# Patient Record
Sex: FEMALE | Race: WHITE | Employment: OTHER | URBAN - METROPOLITAN AREA
[De-identification: names, ages, dates, MRNs, and addresses within clinical notes are randomized per-mention and may not be internally consistent; named-entity substitution may affect disease eponyms.]

---

## 2017-04-28 ENCOUNTER — TRANSFERRED RECORDS (OUTPATIENT)
Dept: HEALTH INFORMATION MANAGEMENT | Facility: CLINIC | Age: 67
End: 2017-04-28

## 2017-06-28 ENCOUNTER — OFFICE VISIT (OUTPATIENT)
Dept: OBGYN | Facility: CLINIC | Age: 67
End: 2017-06-28
Payer: COMMERCIAL

## 2017-06-28 VITALS
BODY MASS INDEX: 34.96 KG/M2 | WEIGHT: 190 LBS | HEIGHT: 62 IN | SYSTOLIC BLOOD PRESSURE: 112 MMHG | DIASTOLIC BLOOD PRESSURE: 74 MMHG

## 2017-06-28 DIAGNOSIS — Z01.419 ENCOUNTER FOR GYNECOLOGICAL EXAMINATION WITHOUT ABNORMAL FINDING: Primary | ICD-10-CM

## 2017-06-28 PROCEDURE — 99397 PER PM REEVAL EST PAT 65+ YR: CPT | Performed by: OBSTETRICS & GYNECOLOGY

## 2017-06-28 PROCEDURE — G0145 SCR C/V CYTO,THINLAYER,RESCR: HCPCS | Performed by: OBSTETRICS & GYNECOLOGY

## 2017-06-28 RX ORDER — PRAVASTATIN SODIUM 40 MG
TABLET ORAL
COMMUNITY
Start: 2017-04-26

## 2017-06-28 RX ORDER — LEVOTHYROXINE SODIUM 150 UG/1
TABLET ORAL
COMMUNITY
Start: 2017-05-26

## 2017-06-28 RX ORDER — CYANOCOBALAMIN 1000 UG/ML
INJECTION, SOLUTION INTRAMUSCULAR; SUBCUTANEOUS
COMMUNITY
Start: 2017-05-10 | End: 2021-07-21

## 2017-06-28 ASSESSMENT — ANXIETY QUESTIONNAIRES
5. BEING SO RESTLESS THAT IT IS HARD TO SIT STILL: NOT AT ALL
2. NOT BEING ABLE TO STOP OR CONTROL WORRYING: NOT AT ALL
IF YOU CHECKED OFF ANY PROBLEMS ON THIS QUESTIONNAIRE, HOW DIFFICULT HAVE THESE PROBLEMS MADE IT FOR YOU TO DO YOUR WORK, TAKE CARE OF THINGS AT HOME, OR GET ALONG WITH OTHER PEOPLE: NOT DIFFICULT AT ALL
3. WORRYING TOO MUCH ABOUT DIFFERENT THINGS: NOT AT ALL
6. BECOMING EASILY ANNOYED OR IRRITABLE: NOT AT ALL
GAD7 TOTAL SCORE: 0
7. FEELING AFRAID AS IF SOMETHING AWFUL MIGHT HAPPEN: NOT AT ALL
1. FEELING NERVOUS, ANXIOUS, OR ON EDGE: NOT AT ALL

## 2017-06-28 ASSESSMENT — PATIENT HEALTH QUESTIONNAIRE - PHQ9: 5. POOR APPETITE OR OVEREATING: NOT AT ALL

## 2017-06-28 NOTE — PROGRESS NOTES
Lois is a 67 year old No obstetric history on file. female who presents for annual exam.     Besides routine health maintenance, she has no other health concerns today .    Do you have a Health Care Directive?: No: Advance care planning was reviewed with patient; patient declined at this time.    Fall risk:   Fallen 2 or more times in the past year?: No  Any fall with injury in the past year?: No    HPI: The patient is seen for annual exam. She has a history of Bowen's disease of the vulva. She has had a hysterectomy.  The patient's PCP is Fuad Madera MD.     GYNECOLOGIC HISTORY:  No LMP recorded. Patient has had a hysterectomy..   reports that she has never smoked. She has never used smokeless tobacco.    Patient is sexually active.  STD testing offered?  Declined  Last PHQ-9 score on record=   PHQ-9 SCORE 6/28/2017   Total Score 0     Last GAD7 score on record=   ELDON-7 SCORE 6/2/2016 6/28/2017   Total Score 0 0     Alcohol Score = 2    HEALTH MAINTENANCE:  Cholesterol: None found.  Last Mammo: one year ago, Result: normal, Next Mammo: tomorrow at [facility]  Pap:   Lab Results   Component Value Date    PAP NIL 06/02/2016    PAP NIL 05/29/2015      DEXA:  02/11/09  Colonoscopy:  2 years ago, Result:  normal, Next Colonoscopy: 8 years.    Health maintenance updated:  yes    HISTORY:  Obstetric History     No data available        There is no problem list on file for this patient.    Past Surgical History:   Procedure Laterality Date     CHOLECYSTECTOMY  2002     ENT SURGERY      ta     GYN SURGERY      sofiya      Social History   Substance Use Topics     Smoking status: Never Smoker     Smokeless tobacco: Never Used     Alcohol use Yes           Current Outpatient Prescriptions   Medication Sig     cyanocobalamin (VITAMIN B12) 1000 MCG/ML injection      levothyroxine (SYNTHROID/LEVOTHROID) 150 MCG tablet Take one tablet by mouth daily before breakfast     acyclovir (ZOVIRAX) 400 MG tablet Take 1 tablet (400 mg) by  "mouth daily     hydrocortisone (CORTEF) 20 MG tablet      estradiol 0.075 MG/24HR PTWK      clonazePAM (KLONOPIN) 0.5 MG tablet      metFORMIN (GLUCOPHAGE-XR) 500 MG 24 hr tablet      pravastatin (PRAVACHOL) 40 MG tablet      No current facility-administered medications for this visit.        Allergies   Allergen Reactions     Nsaids      Other reaction(s): Other - Describe In Comment Field  Pt had gastric bypass surgery, should not take NSAIDS.       Past medical, surgical, social and family history were reviewed and updated in EPIC.    ROS:   12 point review of systems negative other than symptoms noted below.  Eyes: Spots and Vision Loss    EXAM:  /74  Ht 5' 2\" (1.575 m)  Wt 190 lb (86.2 kg)  BMI 34.75 kg/m2   BMI: Body mass index is 34.75 kg/(m^2).    EXAM:  Constitutional: Appearance: Well nourished, well developed alert, in no acute distress  Neck:  Lymph Nodes:  No lymphadenopathy present    Thyroid:  Gland size normal, nontender, no nodules or masses present  on palpation  Chest:  Respiratory Effort:  Breathing unlabored  Cardiovascular:Heart    Auscultation:  Regular rate, normal rhythm, no murmurs present  Breasts: Inspection of Breasts:  No lymphadenopathy present    Palpation of Breasts and Axillae:  No masses present on palpation, no  breast tenderness    Axillary Lymph Nodes:  No lymphadenopathy present  Gastrointestinal:  Abdominal Examination:  Abdomen nontender to palpation, tone normal without     rigidity or guarding, no masses present, umbilicus without lesions    Liver and speen:  No hepatomegaly present, liver nontender to palpation    Hernias:  No hernias present  Lymphatic: Lymph Nodes:  No other lymphadenopathy present  Skin:  General Inspection:  No rashes present, no lesions present, no areas of  discoloration.    Genitalia and Groin:  No rashes present, no lesions present, no areas of  discoloration, no masses present  Neurologic/Psychiatric:    Mental Status:  Oriented " X3     Pelvic Exam:  External Genitalia:     Normal appearance for age, no discharge present, no tenderness present, no inflammatory lesions present, color normal  Vagina:     Normal vaginal vault without central or paravaginal defects, no discharge present, no inflammatory lesions present, no masses present  Bladder:     Nontender to palpation  Urethra:   Urethral Body:  Urethra palpation normal, urethra structural support normal   Urethral Meatus:  No erythema or lesions present  Cervix:     Surgically absent  Uterus:     Surgically absent  Adnexa:     Surgically absent  Perineum:     Perineum within normal limits, no evidence of trauma, no rashes or skin lesions present  Anus:     Anus within normal limits, no hemorrhoids present  Inguinal Lymph Nodes:     No lymphadenopathy present    COUNSELING:   Reviewed preventive health counseling, as reflected in patient instructions       Regular exercise       Healthy diet/nutrition    BMI:  Body mass index is 34.75 kg/(m^2).  Weight management plan noted, stable and monitoring   reports that she has never smoked. She has never used smokeless tobacco.      ASSESSMENT:  67 year old female with satisfactory annual exam.    ICD-10-CM    1. Encounter for gynecological examination without abnormal finding Z01.419 Pap imaged thin layer screen reflex to HPV if ASCUS - recommended age 25 - 29 years       PLAN: The patient is seen at this time for her annual exam. She shows no sign of any recurrence of vulvar disease. She requests ongoing screening on a yearly basis.      Nicola Burkett MD

## 2017-06-28 NOTE — Clinical Note
Please abstract the following data from this visit with this patient into the appropriate field in Epic:  Colonoscopy done on this date: 10/01/2015 (approximately), by this group: [facility], results were normal.

## 2017-06-28 NOTE — MR AVS SNAPSHOT
"              After Visit Summary   2017    Lois Hudson    MRN: [id number]           Patient Information     Date Of Birth          1950        Visit Information        Provider Department      2017 12:00 PM Nicola Burkett MD [facility]        Today's Diagnoses     Encounter for gynecological examination without abnormal finding    -  1       Follow-ups after your visit        Who to contact     If you have questions or need follow up information about today's clinic visit or your schedule please contact [facility] directly at [phone].  Normal or non-critical lab and imaging results will be communicated to you by Frediohart, letter or phone within 4 business days after the clinic has received the results. If you do not hear from us within 7 days, please contact the clinic through Frediohart or phone. If you have a critical or abnormal lab result, we will notify you by phone as soon as possible.  Submit refill requests through Amicrobe or call your pharmacy and they will forward the refill request to us. Please allow 3 business days for your refill to be completed.          Additional Information About Your Visit        MyChart Information     Amicrobe lets you send messages to your doctor, view your test results, renew your prescriptions, schedule appointments and more. To sign up, go to [email or web address] . Click on \"Log in\" on the left side of the screen, which will take you to the Welcome page. Then click on \"Sign up Now\" on the right side of the page.     You will be asked to enter the access code listed below, as well as some personal information. Please follow the directions to create your username and password.     Your access code is: [email or web address]  Expires: 2017 12:31 PM     Your access code will  in 90 days. If you need help or a new code, please call your [facility] clinic or [phone].        Care EveryWhere ID     This is your " "Care EveryWhere ID. This could be used by other organizations to access your [facility] medical records  [id number]        Your Vitals Were     Height BMI (Body Mass Index)                5' 2\" (1.575 m) 34.75 kg/m2           Blood Pressure from Last 3 Encounters:   06/28/17 112/74   06/02/16 110/68   05/29/15 116/72    Weight from Last 3 Encounters:   06/28/17 190 lb (86.2 kg)   06/02/16 185 lb (83.9 kg)   05/29/15 182 lb (82.6 kg)              We Performed the Following     Pap imaged thin layer screen reflex to HPV if ASCUS - recommended age 25 - 29 years        Primary Care Provider Office Phone # Fax #    Fuad Madera [phone]       [facility] [address]  [facility] [address]        Equal Access to Services     [facility]: Hadii aad ku hadasho Soomaali, waaxda luqadaha, qaybta kaalmada adeegyada, felipa garcia haymeagan [facility] . So [facility] [phone].    ATENCIÓN: Si habla español, tiene a rojas disposición servicios gratuitos de asistencia lingüística. Llame al [phone].    We comply with applicable federal civil rights laws and [place] laws. We do not discriminate on the basis of race, color, national origin, age, disability sex, sexual orientation or gender identity.            Thank you!     Thank you for choosing [facility]  for your care. Our goal is always to provide you with excellent care. Hearing back from our patients is one way we can continue to improve our services. Please take a few minutes to complete the written survey that you may receive in the mail after your visit with us. Thank you!             Your Updated Medication List - Protect others around you: Learn how to safely use, store and throw away your medicines at www.disposemymeds.org.          This list is accurate as of: 6/28/17 12:31 PM.  Always use your most recent med list.                   Brand Name Dispense Instructions for use Diagnosis    acyclovir 400 " MG tablet    ZOVIRAX    90 tablet    Take 1 tablet (400 mg) by mouth daily    HSV (herpes simplex virus) infection       clonazePAM 0.5 MG tablet    klonoPIN          cyanocobalamin 1000 MCG/ML injection    VITAMIN B12          estradiol 0.075 MG/24HR Ptwk           hydrocortisone 20 MG tablet    CORTEF          levothyroxine 150 MCG tablet    SYNTHROID/LEVOTHROID     Take one tablet by mouth daily before breakfast        metFORMIN 500 MG 24 hr tablet    GLUCOPHAGE-XR          pravastatin 40 MG tablet    PRAVACHOL

## 2017-06-28 NOTE — LETTER
July 12, 2017      Lois Hudson  [address] [place] [address]  [facility] [address]    Dear ,      I am happy to inform you that your recent cervical cancer screening test (PAP smear) was normal.      Preventative screenings such as this help to ensure your health for years to come. You should repeat a pap smear in 1 year, unless otherwise directed.      You will still need to return to the clinic every year for your annual exam and other preventive tests.     Please contact the clinic at [phone] if you have further questions.       Sincerely,      Nicola Burkett MD/francisco j

## 2017-06-29 ASSESSMENT — ANXIETY QUESTIONNAIRES: GAD7 TOTAL SCORE: 0

## 2017-06-29 ASSESSMENT — PATIENT HEALTH QUESTIONNAIRE - PHQ9: SUM OF ALL RESPONSES TO PHQ QUESTIONS 1-9: 0

## 2017-07-03 LAB
COPATH REPORT: NORMAL
PAP: NORMAL

## 2017-07-05 ENCOUNTER — TRANSFERRED RECORDS (OUTPATIENT)
Dept: HEALTH INFORMATION MANAGEMENT | Facility: CLINIC | Age: 67
End: 2017-07-05

## 2018-02-02 ENCOUNTER — TRANSFERRED RECORDS (OUTPATIENT)
Dept: HEALTH INFORMATION MANAGEMENT | Facility: CLINIC | Age: 68
End: 2018-02-02

## 2018-02-15 ENCOUNTER — TRANSFERRED RECORDS (OUTPATIENT)
Dept: HEALTH INFORMATION MANAGEMENT | Facility: CLINIC | Age: 68
End: 2018-02-15

## 2018-04-25 DIAGNOSIS — B00.9 HSV (HERPES SIMPLEX VIRUS) INFECTION: ICD-10-CM

## 2018-04-25 RX ORDER — ACYCLOVIR 400 MG/1
400 TABLET ORAL DAILY
Qty: 90 TABLET | Refills: 0 | Status: SHIPPED | OUTPATIENT
Start: 2018-04-25 | End: 2018-07-21

## 2018-04-25 NOTE — TELEPHONE ENCOUNTER
Pending Prescriptions:                       Disp   Refills    acyclovir (ZOVIRAX) 400 MG tablet         90 tab*3            Sig: Take 1 tablet (400 mg) by mouth daily          Last Written Prescription Date:  3/15/16  Last Fill Quantity: 90,   # refills: 3  Last Office Visit with MARCUS primary care provider:  6/28/17  Future Office visit: none

## 2018-06-13 ENCOUNTER — TRANSFERRED RECORDS (OUTPATIENT)
Dept: HEALTH INFORMATION MANAGEMENT | Facility: CLINIC | Age: 68
End: 2018-06-13

## 2018-06-29 ENCOUNTER — TRANSFERRED RECORDS (OUTPATIENT)
Dept: HEALTH INFORMATION MANAGEMENT | Facility: CLINIC | Age: 68
End: 2018-06-29

## 2018-06-29 ENCOUNTER — TELEPHONE (OUTPATIENT)
Dept: OPHTHALMOLOGY | Facility: CLINIC | Age: 68
End: 2018-06-29

## 2018-06-29 NOTE — TELEPHONE ENCOUNTER
[facility] has been sending P notes for this Patient has not been seen here for 5 years I let them know to find out who she is seeing for her regular eye care. They will stop sending to this office. Walter Sam  Is retired.

## 2018-07-21 DIAGNOSIS — B00.9 HSV (HERPES SIMPLEX VIRUS) INFECTION: ICD-10-CM

## 2018-07-23 RX ORDER — ACYCLOVIR 400 MG/1
TABLET ORAL
Qty: 90 TABLET | Refills: 0 | Status: SHIPPED | OUTPATIENT
Start: 2018-07-23 | End: 2019-02-13

## 2018-07-23 NOTE — TELEPHONE ENCOUNTER
"Requested Prescriptions   Pending Prescriptions Disp Refills     acyclovir (ZOVIRAX) 400 MG tablet [Pharmacy Med Name: ACYCLOVIR 400MG     TAB] 90 tablet 0     Sig: TAKE 1 TABLET BY MOUTH ONCE DAILY    Antivirals for Herpes Protocol Failed    7/21/2018  5:30 AM       Failed - Recent (12 mo) or future (30 days) visit within the authorizing provider's specialty    Patient had office visit in the last 12 months or has a visit in the next 30 days with authorizing provider or within the authorizing provider's specialty.  See \"Patient Info\" tab in inbasket, or \"Choose Columns\" in Meds & Orders section of the refill encounter.           Failed - Normal serum creatinine on file in past 12 months    No lab results found.         Passed - Patient is age 12 or older        Last Written Prescription Date:  4/25/18  Last Fill Quantity: 90,  # refills: 0   Last office visit: 6/28/2017 with prescribing provider:     Future Office Visit:      "

## 2019-02-13 ENCOUNTER — OFFICE VISIT (OUTPATIENT)
Dept: OBGYN | Facility: CLINIC | Age: 69
End: 2019-02-13
Payer: MEDICARE

## 2019-02-13 VITALS
SYSTOLIC BLOOD PRESSURE: 100 MMHG | DIASTOLIC BLOOD PRESSURE: 62 MMHG | HEART RATE: 78 BPM | WEIGHT: 166 LBS | HEIGHT: 62 IN | BODY MASS INDEX: 30.55 KG/M2

## 2019-02-13 DIAGNOSIS — N95.1 SYMPTOMS, SUCH AS FLUSHING, SLEEPLESSNESS, HEADACHE, LACK OF CONCENTRATION, ASSOCIATED WITH THE MENOPAUSE: ICD-10-CM

## 2019-02-13 DIAGNOSIS — B00.9 HSV (HERPES SIMPLEX VIRUS) INFECTION: ICD-10-CM

## 2019-02-13 DIAGNOSIS — Z01.419 ENCOUNTER FOR GYNECOLOGICAL EXAMINATION WITHOUT ABNORMAL FINDING: Primary | ICD-10-CM

## 2019-02-13 PROCEDURE — G0476 HPV COMBO ASSAY CA SCREEN: HCPCS | Performed by: OBSTETRICS & GYNECOLOGY

## 2019-02-13 PROCEDURE — 87624 HPV HI-RISK TYP POOLED RSLT: CPT | Performed by: OBSTETRICS & GYNECOLOGY

## 2019-02-13 PROCEDURE — G0145 SCR C/V CYTO,THINLAYER,RESCR: HCPCS | Performed by: OBSTETRICS & GYNECOLOGY

## 2019-02-13 PROCEDURE — G0101 CA SCREEN;PELVIC/BREAST EXAM: HCPCS | Performed by: OBSTETRICS & GYNECOLOGY

## 2019-02-13 RX ORDER — ESTRADIOL 0.07 MG/D
1 PATCH TRANSDERMAL
Qty: 12 PATCH | Refills: 3 | Status: SHIPPED | OUTPATIENT
Start: 2019-02-13 | End: 2020-07-06

## 2019-02-13 RX ORDER — ACYCLOVIR 400 MG/1
400 TABLET ORAL DAILY
Qty: 90 TABLET | Refills: 3 | Status: SHIPPED | OUTPATIENT
Start: 2019-02-13

## 2019-02-13 RX ORDER — PREDNISOLONE ACETATE 10 MG/ML
1 SUSPENSION/ DROPS OPHTHALMIC
COMMUNITY
Start: 2018-10-24

## 2019-02-13 ASSESSMENT — ANXIETY QUESTIONNAIRES
3. WORRYING TOO MUCH ABOUT DIFFERENT THINGS: NOT AT ALL
GAD7 TOTAL SCORE: 0
2. NOT BEING ABLE TO STOP OR CONTROL WORRYING: NOT AT ALL
6. BECOMING EASILY ANNOYED OR IRRITABLE: NOT AT ALL
7. FEELING AFRAID AS IF SOMETHING AWFUL MIGHT HAPPEN: NOT AT ALL
1. FEELING NERVOUS, ANXIOUS, OR ON EDGE: NOT AT ALL
5. BEING SO RESTLESS THAT IT IS HARD TO SIT STILL: NOT AT ALL

## 2019-02-13 ASSESSMENT — PATIENT HEALTH QUESTIONNAIRE - PHQ9
5. POOR APPETITE OR OVEREATING: NOT AT ALL
SUM OF ALL RESPONSES TO PHQ QUESTIONS 1-9: 0

## 2019-02-13 ASSESSMENT — MIFFLIN-ST. JEOR: SCORE: 1241.22

## 2019-02-13 NOTE — PROGRESS NOTES
Lois is a 68 year old No obstetric history on file. female who presents for Medicare Limited exam.     Do you have a Health Care Directive?: Yes: Patient states has Advance Directive and will bring in a copy to clinic.    Fall risk:   Fallen 2 or more times in the past year?: No  Any fall with injury in the past year?: No    HPI : The patient is seen at this time for her annual GYN exam.  She has a past history of Bowen's disease of the vulva with no evidence of recurrence for years.  She has had a hysterectomy.  She has an unexplained 34 pound weight loss over the last year.  She is seeing her internist tomorrow.      GYNECOLOGIC HISTORY:  No LMP recorded. Patient has had a hysterectomy..   reports that  has never smoked. she has never used smokeless tobacco.    STD testing offered?  Declined  Last PHQ-9 score on record=   PHQ-9 SCORE 2/13/2019   PHQ-9 Total Score 0     Last GAD7 score on record=   ELDON-7 SCORE 6/2/2016 6/28/2017 2/13/2019   Total Score 0 0 0       HEALTH MAINTENANCE:  Cholesterol: (No results found for: CHOL   Last Mammo: 6/13/18 @ [facility], Result: normal, Next Mammo: June 2019   Pap: 6/28/17 neg  DEXA: 2/11/09  Colonoscopy: 6/5/13, Result:  normal, Next Colonoscopy: 4 years.    HISTORY:  Obstetric History     No data available        Past Medical History:   Diagnosis Date     Diabetes (H)      Past Surgical History:   Procedure Laterality Date     CHOLECYSTECTOMY  2002     ENT SURGERY      ta     GYN SURGERY      sofiya     VULVECTOMY SIMPLE Right 2001    abstracted report, KRISTA-3     No family history on file.  Social History     Socioeconomic History     Marital status: Single     Spouse name: Not on file     Number of children: Not on file     Years of education: Not on file     Highest education level: Not on file   Social Needs     Financial resource strain: Not on file     Food insecurity - worry: Not on file     Food insecurity - inability: Not on file     Transportation needs - medical: Not  "on file     Transportation needs - non-medical: Not on file   Occupational History     Not on file   Tobacco Use     Smoking status: Never Smoker     Smokeless tobacco: Never Used   Substance and Sexual Activity     Alcohol use: Yes     Drug use: No     Sexual activity: Not Currently   Other Topics Concern     Parent/sibling w/ CABG, MI or angioplasty before 65F 55M? Not Asked   Social History Narrative     Not on file     Current Outpatient Medications   Medication Sig     prednisoLONE acetate (PRED FORTE) 1 % ophthalmic suspension Apply 1 drop to eye     acyclovir (ZOVIRAX) 400 MG tablet TAKE 1 TABLET BY MOUTH ONCE DAILY     clonazePAM (KLONOPIN) 0.5 MG tablet      cyanocobalamin (VITAMIN B12) 1000 MCG/ML injection      estradiol 0.075 MG/24HR PTWK      hydrocortisone (CORTEF) 20 MG tablet      levothyroxine (SYNTHROID/LEVOTHROID) 150 MCG tablet Take one tablet by mouth daily before breakfast     metFORMIN (GLUCOPHAGE-XR) 500 MG 24 hr tablet      pravastatin (PRAVACHOL) 40 MG tablet      No current facility-administered medications for this visit.      Allergies   Allergen Reactions     Nsaids      Other reaction(s): Other - Describe In Comment Field  Pt had gastric bypass surgery, should not take NSAIDS.       Past medical, surgical, social and family history were reviewed and updated in EPIC.    EXAM:  /62   Pulse 78   Ht 1.575 m (5' 2\")   Wt 75.3 kg (166 lb)   BMI 30.36 kg/m     BMI: Body mass index is 30.36 kg/m .    Constitutional: Appearance: Well nourished, well developed alert, in no acute distress  Breasts: Inspection of Breasts:  No lymphadenopathy present    Palpation of Breasts and Axillae:  No masses present on palpation, no  breast tenderness    Axillary Lymph Nodes:  No lymphadenopathy present  Neurologic/Psychiatric:    Mental Status:  Oriented X3     Pelvic Exam:  External Genitalia:     Normal appearance for age, no discharge present, no tenderness present, no inflammatory lesions " present, color normal  Vagina:     Normal vaginal vault without central or paravaginal defects, no discharge present, no inflammatory lesions present, no masses present  Bladder:     Nontender to palpation  Urethra:   Urethral Body:  Urethra palpation normal, urethra structural support normal   Urethral Meatus:  No erythema or lesions present  Cervix:     Surgically absent  Uterus:     Surgically absent  Adnexa:     Surgically absent  Perineum:     Perineum within normal limits, no evidence of trauma, no rashes or skin lesions present  Anus:     Anus within normal limits, no hemorrhoids present  Inguinal Lymph Nodes:     No lymphadenopathy present    Body mass index is 30.36 kg/m .  Weight management plan noted, stable and monitoring   reports that  has never smoked. she has never used smokeless tobacco.      ASSESSMENT:  68 year old female with satisfactory annual exam.    ICD-10-CM    1. Encounter for gynecological examination without abnormal finding Z01.419 Pap imaged thin layer screen with HPV - recommended age 30 - 65     HPV High Risk Types DNA Cervical     Medicare Limited Visit   2. HSV (herpes simplex virus) infection B00.9        COUNSELING:   Reviewed preventive health counseling, as reflected in patient instructions       Regular exercise       Healthy diet/nutrition    PLAN/PATIENT INSTRUCTIONS:    The patient has her mammograms performed elsewhere and have been normal.  We will refill her medications.    Nicola Burkett MD

## 2019-02-13 NOTE — LETTER
February 22, 2019    Lois Hudson  [facility] [address]  [facility] [address]    Dear ,  This letter is regarding your recent Pap smear (cervical cancer screening) and Human Papillomavirus (HPV) test.  We are happy to inform you that your Pap smear result is normal. Cervical cancer is closely linked with certain types of HPV. Your results showed no evidence of high-risk HPV.  Therefore we recommend you return in 1 year for your next pap smear.  You will still need to return to the clinic every year for an annual exam and other preventive tests.  If you have additional questions regarding this result, please call our registered nurse, Ekaterina at [phone].  Sincerely,    Nicola Burkett MD/francisco j

## 2019-02-14 ASSESSMENT — ANXIETY QUESTIONNAIRES: GAD7 TOTAL SCORE: 0

## 2019-02-15 LAB
COPATH REPORT: NORMAL
PAP: NORMAL

## 2019-02-19 LAB
FINAL DIAGNOSIS: NORMAL
HPV HR 12 DNA CVX QL NAA+PROBE: NEGATIVE
HPV16 DNA SPEC QL NAA+PROBE: NEGATIVE
HPV18 DNA SPEC QL NAA+PROBE: NEGATIVE
SPECIMEN DESCRIPTION: NORMAL
SPECIMEN SOURCE CVX/VAG CYTO: NORMAL

## 2019-03-22 ENCOUNTER — TRANSFERRED RECORDS (OUTPATIENT)
Dept: HEALTH INFORMATION MANAGEMENT | Facility: CLINIC | Age: 69
End: 2019-03-22

## 2020-07-06 DIAGNOSIS — N95.1 SYMPTOMS, SUCH AS FLUSHING, SLEEPLESSNESS, HEADACHE, LACK OF CONCENTRATION, ASSOCIATED WITH THE MENOPAUSE: ICD-10-CM

## 2020-07-06 RX ORDER — ESTRADIOL 0.07 MG/D
PATCH TRANSDERMAL
Qty: 1 PATCH | Refills: 0 | Status: SHIPPED | OUTPATIENT
Start: 2020-07-06

## 2020-07-06 NOTE — TELEPHONE ENCOUNTER
"Requested Prescriptions   Pending Prescriptions Disp Refills     estradiol (CLIMARA) 0.075 MG/24HR weekly patch [Pharmacy Med Name: Estradiol 0.075 MG/24HR Transdermal Patch Weekly]  0     Sig: APPLY 1 PATCH TOPICALLY ONCE A WEEK       Hormone Replacement Therapy Failed - 7/6/2020  8:03 AM        Failed - Blood pressure under 140/90 in past 12 months     BP Readings from Last 3 Encounters:   02/13/19 100/62   06/28/17 112/74   06/02/16 110/68                 Failed - Recent (12 mo) or future (30 days) visit within the authorizing provider's specialty     Patient has had an office visit with the authorizing provider or a provider within the authorizing providers department within the previous 12 mos or has a future within next 30 days. See \"Patient Info\" tab in inbasket, or \"Choose Columns\" in Meds & Orders section of the refill encounter.              Failed - Patient has mammogram in past 2 years on file if age 50-75        Passed - Medication is active on med list        Passed - Patient is 18 years of age or older        Passed - No active pregnancy on record        Passed - No positive pregnancy test on record in past 12 months           One refill sent  Appointment needed for further refills  Demetra Gay RN on 7/6/2020 at 8:51 AM    "

## 2020-07-07 RX ORDER — ESTRADIOL 0.07 MG/D
PATCH TRANSDERMAL
Qty: 1 PATCH | Refills: 0 | OUTPATIENT
Start: 2020-07-07

## 2020-07-07 NOTE — TELEPHONE ENCOUNTER
rec'd message from pharmacy that they cannot fill just 1 patch. New prescription needs to be sent for 1 box.

## 2020-07-07 NOTE — TELEPHONE ENCOUNTER
Refused refill for more than one patch. Overdue for annual exam by 5 months. Message sent to pharmacy.  Maria Eugenia Luis RN on 7/7/2020 at 4:08 PM

## 2020-07-09 DIAGNOSIS — N95.1 SYMPTOMS, SUCH AS FLUSHING, SLEEPLESSNESS, HEADACHE, LACK OF CONCENTRATION, ASSOCIATED WITH THE MENOPAUSE: ICD-10-CM

## 2020-07-09 RX ORDER — ESTRADIOL 0.07 MG/D
4 PATCH TRANSDERMAL
Qty: 1 PATCH | Refills: 0 | OUTPATIENT
Start: 2020-07-09

## 2020-07-09 NOTE — TELEPHONE ENCOUNTER
"Requested Prescriptions   Pending Prescriptions Disp Refills     estradiol (CLIMARA) 0.075 MG/24HR weekly patch 1 patch 0     Sig: Place 4 patches onto the skin       Hormone Replacement Therapy Failed - 7/9/2020 12:18 PM        Failed - Blood pressure under 140/90 in past 12 months     BP Readings from Last 3 Encounters:   02/13/19 100/62   06/28/17 112/74   06/02/16 110/68                 Failed - Recent (12 mo) or future (30 days) visit within the authorizing provider's specialty     Patient has had an office visit with the authorizing provider or a provider within the authorizing providers department within the previous 12 mos or has a future within next 30 days. See \"Patient Info\" tab in inbasket, or \"Choose Columns\" in Meds & Orders section of the refill encounter.              Failed - Patient has mammogram in past 2 years on file if age 50-75        Passed - Medication is active on med list        Passed - Patient is 18 years of age or older        Passed - No active pregnancy on record        Passed - No positive pregnancy test on record in past 12 months           Last Written Prescription Date:  07/06/2020  Last Fill Quantity: 1 patch,  # refills: 0   Last office visit: 2/13/2019 with prescribing provider:  Nicola Burkett   Future Office Visit:  none  Denied  Appointment needed for further refills  Demetra Gay RN on 7/9/2020 at 12:25 PM      "

## 2021-07-21 ENCOUNTER — OFFICE VISIT (OUTPATIENT)
Dept: OBGYN | Facility: CLINIC | Age: 71
End: 2021-07-21
Payer: MEDICARE

## 2021-07-21 VITALS
HEART RATE: 64 BPM | WEIGHT: 166.2 LBS | BODY MASS INDEX: 31.38 KG/M2 | SYSTOLIC BLOOD PRESSURE: 132 MMHG | HEIGHT: 61 IN | DIASTOLIC BLOOD PRESSURE: 70 MMHG

## 2021-07-21 DIAGNOSIS — Z01.419 ENCOUNTER FOR GYNECOLOGICAL EXAMINATION WITHOUT ABNORMAL FINDING: Primary | ICD-10-CM

## 2021-07-21 PROCEDURE — G0145 SCR C/V CYTO,THINLAYER,RESCR: HCPCS | Performed by: OBSTETRICS & GYNECOLOGY

## 2021-07-21 PROCEDURE — G0101 CA SCREEN;PELVIC/BREAST EXAM: HCPCS | Performed by: OBSTETRICS & GYNECOLOGY

## 2021-07-21 PROCEDURE — 87624 HPV HI-RISK TYP POOLED RSLT: CPT | Performed by: OBSTETRICS & GYNECOLOGY

## 2021-07-21 RX ORDER — LIOTHYRONINE SODIUM 5 UG/1
5 TABLET ORAL
COMMUNITY
Start: 2020-10-30

## 2021-07-21 RX ORDER — PROPRANOLOL HCL 60 MG
60 CAPSULE, EXTENDED RELEASE 24HR ORAL
COMMUNITY
Start: 2020-10-30

## 2021-07-21 RX ORDER — UBIDECARENONE 75 MG
100 CAPSULE ORAL DAILY
COMMUNITY

## 2021-07-21 ASSESSMENT — ANXIETY QUESTIONNAIRES
1. FEELING NERVOUS, ANXIOUS, OR ON EDGE: NOT AT ALL
6. BECOMING EASILY ANNOYED OR IRRITABLE: NOT AT ALL
3. WORRYING TOO MUCH ABOUT DIFFERENT THINGS: NOT AT ALL
5. BEING SO RESTLESS THAT IT IS HARD TO SIT STILL: NOT AT ALL
IF YOU CHECKED OFF ANY PROBLEMS ON THIS QUESTIONNAIRE, HOW DIFFICULT HAVE THESE PROBLEMS MADE IT FOR YOU TO DO YOUR WORK, TAKE CARE OF THINGS AT HOME, OR GET ALONG WITH OTHER PEOPLE: NOT DIFFICULT AT ALL
GAD7 TOTAL SCORE: 0
7. FEELING AFRAID AS IF SOMETHING AWFUL MIGHT HAPPEN: NOT AT ALL
2. NOT BEING ABLE TO STOP OR CONTROL WORRYING: NOT AT ALL

## 2021-07-21 ASSESSMENT — PATIENT HEALTH QUESTIONNAIRE - PHQ9
SUM OF ALL RESPONSES TO PHQ QUESTIONS 1-9: 0
5. POOR APPETITE OR OVEREATING: NOT AT ALL

## 2021-07-21 ASSESSMENT — MIFFLIN-ST. JEOR: SCORE: 1215.22

## 2021-07-21 NOTE — PROGRESS NOTES
Lois is a 71 year old No obstetric history on file. female who presents for Medicare Limited exam.     Do you have a Health Care Directive?: No, advance care planning information given to patient to review.  Patient plans to discuss their wishes with loved ones or provider.      Fall risk:   Fallen 2 or more times in the past year?: No  Any fall with injury in the past year?: No    HPI : The patient is seen at this time for her annual exam.  She has had a hysterectomy.  She had Bowen's disease on the vulva many years ago with no sign of recurrence.  She had one abnormal Pap smear many years ago but has been normal since.  She receives her mammography through consulting radiology and is up-to-date.      GYNECOLOGIC HISTORY:  No LMP recorded. Patient has had a hysterectomy..   reports that she has never smoked. She has never used smokeless tobacco.    STD testing offered?  Declined  Last PHQ-9 score on record=   PHQ-9 SCORE 7/21/2021   PHQ-9 Total Score 0     Last GAD7 score on record=   ELDON-7 SCORE 6/28/2017 2/13/2019 7/21/2021   Total Score 0 0 0       HEALTH MAINTENANCE:  Cholesterol: (No results found for: CHOL   Last Mammo: 11/12/2020, Result: Normal, Next Mammo: 11/2021   Pap:   Lab Results   Component Value Date    PAP NIL 02/13/2019    PAP NIL 06/28/2017    PAP NIL 06/02/2016      DEXA:  02/11/2009  Colonoscopy:  10/01/2015, Result:  Normal, Next Colonoscopy: 4 years.    HISTORY:  OB History   No obstetric history on file.     Past Medical History:   Diagnosis Date     Diabetes (H)      Past Surgical History:   Procedure Laterality Date     CHOLECYSTECTOMY  2002     ENT SURGERY      ta     GYN SURGERY      sofiya     VULVECTOMY SIMPLE Right 2001    abstracted report, [id number]     History reviewed. No pertinent family history.  Social History     Socioeconomic History     Marital status: Single     Spouse name: Not on file     Number of children: Not on file     Years of education: Not on file     Highest education  level: Not on file   Occupational History     Not on file   Tobacco Use     Smoking status: Never Smoker     Smokeless tobacco: Never Used   Substance and Sexual Activity     Alcohol use: Yes     Drug use: No     Sexual activity: Not Currently     Birth control/protection: Female Surgical     Comment: GERARDO   Other Topics Concern     Parent/sibling w/ CABG, MI or angioplasty before 65F 55M? Not Asked   Social History Narrative     Not on file     Social Determinants of Health     Financial Resource Strain:      Difficulty of Paying Living Expenses:    Food Insecurity:      Worried About Running Out of Food in the Last Year:      Ran Out of Food in the Last Year:    Transportation Needs:      Lack of Transportation (Medical):      Lack of Transportation (Non-Medical):    Physical Activity:      Days of Exercise per Week:      Minutes of Exercise per Session:    Stress:      Feeling of Stress :    Social Connections:      Frequency of Communication with Friends and Family:      Frequency of Social Gatherings with Friends and Family:      Attends Jew Services:      Active Member of Clubs or Organizations:      Attends Club or Organization Meetings:      Marital Status:    Intimate Partner Violence:      Fear of Current or Ex-Partner:      Emotionally Abused:      Physically Abused:      Sexually Abused:      Current Outpatient Medications   Medication Sig     acyclovir (ZOVIRAX) 400 MG tablet Take 1 tablet (400 mg) by mouth daily     clonazePAM (KLONOPIN) 0.5 MG tablet      cyanocobalamin (VITAMIN B-12) 100 MCG tablet Take 100 mcg by mouth daily     estradiol (CLIMARA) 0.075 MG/24HR weekly patch APPLY 1 PATCH TOPICALLY ONCE A WEEK     hydrocortisone (CORTEF) 20 MG tablet      levothyroxine (SYNTHROID/LEVOTHROID) 150 MCG tablet Take one tablet by mouth daily before breakfast     liothyronine (CYTOMEL) 5 MCG tablet Take 5 mcg by mouth     metFORMIN (GLUCOPHAGE-XR) 500 MG 24 hr tablet      pravastatin (PRAVACHOL) 40  "MG tablet      prednisoLONE acetate (PRED FORTE) 1 % ophthalmic suspension Apply 1 drop to eye     propranolol ER (INDERAL LA) 60 MG 24 hr capsule Take 60 mg by mouth     No current facility-administered medications for this visit.     Allergies   Allergen Reactions     Nsaids      Other reaction(s): Other - Describe In Comment Field  Pt had gastric bypass surgery, should not take NSAIDS.       Past medical, surgical, social and family history were reviewed and updated in EPIC.    EXAM:  /70   Pulse 64   Ht 1.556 m (5' 1.25\")   Wt 75.4 kg (166 lb 3.2 oz)   Breastfeeding No   BMI 31.15 kg/m     BMI: Body mass index is 31.15 kg/m .    Constitutional: Appearance: Well nourished, well developed alert, in no acute distress  Breasts: Inspection of Breasts:  No lymphadenopathy present    Palpation of Breasts and Axillae:  No masses present on palpation, no  breast tenderness    Axillary Lymph Nodes:  No lymphadenopathy present  Neurologic/Psychiatric:    Mental Status:  Oriented X3     Pelvic Exam:  External Genitalia:     Normal appearance for age, no discharge present, no tenderness present, no inflammatory lesions present, color normal  Vagina:     Normal vaginal vault without central or paravaginal defects, no discharge present, no inflammatory lesions present, no masses present  Bladder:     Nontender to palpation  Urethra:   Urethral Body:  Urethra palpation normal, urethra structural support normal   Urethral Meatus:  No erythema or lesions present  Cervix:     Surgically absent  Uterus:     Surgically absent  Adnexa:     Surgically absent  Perineum:     Perineum within normal limits, no evidence of trauma, no rashes or skin lesions present  Anus:     Anus within normal limits, no hemorrhoids present  Inguinal Lymph Nodes:     No lymphadenopathy present    Body mass index is 31.15 kg/m .  Weight management plan noted, stable and monitoring   reports that she has never smoked. She has never used smokeless " tobacco.      ASSESSMENT:  71 year old female with satisfactory annual exam.    ICD-10-CM    1. Encounter for gynecological examination without abnormal finding  Z01.419 Pap imaged thin layer screen with HPV - recommended age 30 - 65     Medicare Limited Visit       COUNSELING:   Reviewed preventive health counseling, as reflected in patient instructions       Regular exercise       Healthy diet/nutrition    PLAN/PATIENT INSTRUCTIONS: We will convey the patient's Pap results.  She needs no further Pap screening.        Nicola Burkett MD

## 2021-07-22 ASSESSMENT — ANXIETY QUESTIONNAIRES: GAD7 TOTAL SCORE: 0

## 2021-07-26 LAB
BKR LAB AP GYN ADEQUACY: NORMAL
BKR LAB AP GYN INTERPRETATION: NORMAL
BKR LAB AP HPV REFLEX: NORMAL
BKR LAB AP PREVIOUS ABNORMAL: NORMAL
PATH REPORT.COMMENTS IMP SPEC: NORMAL
PATH REPORT.RELEVANT HX SPEC: NORMAL

## 2021-07-28 LAB
HUMAN PAPILLOMA VIRUS 16 DNA: NEGATIVE
HUMAN PAPILLOMA VIRUS 18 DNA: NEGATIVE
HUMAN PAPILLOMA VIRUS FINAL DIAGNOSIS: NORMAL
HUMAN PAPILLOMA VIRUS OTHER HR: NEGATIVE

## 2021-12-23 ENCOUNTER — TRANSFERRED RECORDS (OUTPATIENT)
Dept: HEALTH INFORMATION MANAGEMENT | Facility: CLINIC | Age: 71
End: 2021-12-23
Payer: MEDICARE